# Patient Record
Sex: FEMALE | URBAN - METROPOLITAN AREA
[De-identification: names, ages, dates, MRNs, and addresses within clinical notes are randomized per-mention and may not be internally consistent; named-entity substitution may affect disease eponyms.]

---

## 2021-02-11 ENCOUNTER — OFFICE VISIT (OUTPATIENT)
Dept: FAMILY MEDICINE | Facility: CLINIC | Age: 41
End: 2021-02-11

## 2021-02-11 VITALS
BODY MASS INDEX: 27.05 KG/M2 | SYSTOLIC BLOOD PRESSURE: 125 MMHG | HEART RATE: 61 BPM | WEIGHT: 147 LBS | TEMPERATURE: 97.9 F | RESPIRATION RATE: 20 BRPM | OXYGEN SATURATION: 97 % | HEIGHT: 62 IN | DIASTOLIC BLOOD PRESSURE: 85 MMHG

## 2021-02-11 DIAGNOSIS — B96.89 BACTERIAL VAGINOSIS: ICD-10-CM

## 2021-02-11 DIAGNOSIS — Z11.3 SCREEN FOR STD (SEXUALLY TRANSMITTED DISEASE): Primary | ICD-10-CM

## 2021-02-11 DIAGNOSIS — N76.0 BACTERIAL VAGINOSIS: ICD-10-CM

## 2021-02-11 DIAGNOSIS — R30.0 DYSURIA: ICD-10-CM

## 2021-02-11 DIAGNOSIS — A74.9 CHLAMYDIA INFECTION: ICD-10-CM

## 2021-02-11 LAB
ALBUMIN UR-MCNC: 10 MG/DL
APPEARANCE UR: CLEAR
BACTERIA #/AREA URNS HPF: ABNORMAL /HPF
BILIRUB UR QL STRIP: NEGATIVE
COLOR UR AUTO: ABNORMAL
GLUCOSE UR STRIP-MCNC: NEGATIVE MG/DL
HGB UR QL STRIP: NEGATIVE
KETONES UR STRIP-MCNC: 5 MG/DL
LEUKOCYTE ESTERASE UR QL STRIP: ABNORMAL
MUCOUS THREADS #/AREA URNS LPF: PRESENT /LPF
NITRATE UR QL: NEGATIVE
PH UR STRIP: 6 PH (ref 5–7)
RBC #/AREA URNS AUTO: 2 /HPF (ref 0–2)
SOURCE: ABNORMAL
SP GR UR STRIP: 1.03 (ref 1–1.03)
SPECIMEN SOURCE: ABNORMAL
SQUAMOUS #/AREA URNS AUTO: 2 /HPF (ref 0–1)
TRANS CELLS #/AREA URNS HPF: <1 /HPF (ref 0–1)
UROBILINOGEN UR STRIP-MCNC: NORMAL MG/DL (ref 0–2)
WBC #/AREA URNS AUTO: 13 /HPF (ref 0–5)
WET PREP SPEC: ABNORMAL

## 2021-02-11 RX ORDER — PRENATAL VIT/IRON FUM/FOLIC AC 27MG-0.8MG
1 TABLET ORAL DAILY
COMMUNITY

## 2021-02-11 RX ORDER — AZATHIOPRINE 50 MG/1
50 TABLET ORAL DAILY
COMMUNITY

## 2021-02-11 RX ORDER — FOLIC ACID 1 MG/1
1 TABLET ORAL DAILY
COMMUNITY

## 2021-02-11 RX ORDER — CETIRIZINE HYDROCHLORIDE 10 MG/1
10 TABLET ORAL DAILY
COMMUNITY

## 2021-02-11 ASSESSMENT — MIFFLIN-ST. JEOR: SCORE: 1303.29

## 2021-02-12 LAB
C TRACH DNA SPEC QL NAA+PROBE: POSITIVE
HBV SURFACE AG SERPL QL IA: NONREACTIVE
HIV 1+2 AB+HIV1 P24 AG SERPL QL IA: NONREACTIVE
N GONORRHOEA DNA SPEC QL NAA+PROBE: NEGATIVE
SPECIMEN SOURCE: ABNORMAL
SPECIMEN SOURCE: NORMAL
T PALLIDUM AB SER QL: NONREACTIVE

## 2021-02-12 NOTE — NURSING NOTE
"Patient presented with complaints of suspected chlamydia from a new sexual partner. She and her partner do not use protection currently and she would like to become pregnant. Her partner is not experiencing any abnormal reproductive symptoms.The patients states she has to urinate more frequently and even when she is finished she still feels the urge to urinate. She experiences pain in the genital area that is aggravated upon urination. She describes mild hematuria, itchiness, and a large amount of \"dirty\" looking discharge. The patient states she has had chlamydia before and this seems the same. Discharge and red irritation was observed in the genital area.d A urinalysis will be completed as well as a blood test for Hepatitis B, HIV, and syphlyis. A genital swab and wet prep were completed to test for STIs.   "

## 2021-02-12 NOTE — PROGRESS NOTES
[facility] Pharmacy Progress Note    Chief complaint: DHS presents today for evaluation of a genitourinary infection    Last date of full med:     Subjective:  DHS is a 40 year old female who presents at clinic today for evaluation of a genitourinary infection. She had unprotected sexual intercourse 9 days ago and since then has been experiencing symptoms of burning, itching, and discharge. She states that she had chlamydia in the past and relates these symptoms to being similar to when she had it.       Current Outpatient Medications   Medication Sig Dispense Refill     azaTHIOprine (IMURAN) 50 MG tablet Take 50 mg by mouth daily       cetirizine (ZYRTEC) 10 MG tablet Take 10 mg by mouth daily       folic acid (FOLVITE) 1 MG tablet Take 1 mg by mouth daily       Prenatal Vit-Fe Fumarate-FA (PRENATAL MULTIVITAMIN W/IRON) 27-0.8 MG tablet Take 1 tablet by mouth daily       ibuprofen (ADVIL,MOTRIN) 200 MG tablet Take 200 mg by mouth every 4 hours as needed.       Multiple Vitamins-Minerals (MULTIVITAMIN & MINERAL PO) Take  by mouth.           Objective:  There were no vitals taken for this visit.    BP: 125/85     Assessment:     Genitourinary Infection:  DTP: Needs Additional Therapy: untreated condition   Rationale: Patient is experiencing symptoms burning, itching, and discharge in her vaginal area. A urine culture was done today as well as STD screening. After these labs come in, a treatment regimen can be recommended.     Rhuematoid Arthritis :  DTP: None  Rationale: Patient is currently taking azathioprine, folic acid, and cetirizine for this rhuematoid arthritis. Patient sees a different doctor for this condition and condition is controlled at this time.      Plan:  1. Pending patient's lab results a drug therapy recommendation can be made.       Follow-Up:  N/A    Pharmacy Clinician: Ester Bingham  Pharmacy Preceptor: Kristy Pérez    _____________________________  I am signing this for the preceptor who has been  unable to sign this note in a timely manner.  The content of this note has been reviewed by the preceptor for accuracy.  I did not participate in the care of this patient.  Desmond Ibanez MD - Medical Director, [facility]

## 2021-02-12 NOTE — PATIENT INSTRUCTIONS
En pimentel dionte hoy, hicimos un cultivo de orina y vamos a hacer marcos prueba de varias infecciones. Le vamos a llamar con los resultados en unos espana y Avelina, nuestra estudiante de medicina, le va a hablar sobre los proximos pasos. En la parte abajo, puede jong la informacion sobre enfermedades de transmision sexual mientras espere los resultados.    Patient Education     Enfermedad De Transmisión Sexual, Sospechada [STD, Suspected, Culture Only]  Los síntomas que usted tiene sugieren que usted tiene marcos enfermedad de transmisión sexual (ETS). Las causas mas comunes de ETS son la Chlamydia, Gonorrea y Herpes. Estas son muy contagiosas y se pasan kelvin las relaciones sexuales con marcos persona infectada.  Se chopra realizado pruebas de cultivo de clamidia y gonorrea, que mostrarán si tiene honorio tipo de infección.  Cuidado En [place]:    Si el examen es positivo, usted y pimentel nahum (o parejas) sexual van a necesitar tratamiento antibiótico (con la excepción del herpes). El tratamiento es necesario aunque pimentel nahum (o parejas) no tenga síntomas. En honorio rodney, pimentel nahum (o parejas) avisarle a pimentel propio doctor o ir al [facility] [place] para un examen y tratamiento.    Evite las relaciones sexuales hasta que sabe que el resultado de pimentel examen es negativo.  Seguimiento  con pimentel médico de acuerdo a lo indicado, o si no empieza a mejorar luego de lissa días de síntomas agudos. Si le tomaron marcos muestra para cultivo puede llamar por el resultado, a menos que se le indique lo contrario.  Busque Prontamente Atención Médica  si algo de lo siguiente ocurre:    Fiebre por encima de los 100.4  F (38.0  C) grados después de 2 días de tratamiento    Ronchas (sarpullido) o dolor en las coyunturas    Llagas (úlceras) abiertas en el pene o la vagina    Dolor en la parte baja del abdomen    Hinchazón en las glándulas (linfáticas) cerca al lamine    [address] [employer], [place]. [address] [place] [id number]. Todos los  derechos reservados. Esta información no pretende sustituir la atención médica profesional. Sólo pimentel médico puede diagnosticar y tratar un problema de eula.

## 2021-02-13 LAB
BACTERIA SPEC CULT: ABNORMAL
BACTERIA SPEC CULT: ABNORMAL
Lab: ABNORMAL
SPECIMEN SOURCE: ABNORMAL

## 2021-02-14 NOTE — PROGRESS NOTES
"MEDICINE NOTE    SUBJECTIVE:  Taylor Crystal is a 40-year-old female who presents today with vaginal and urinary symptoms. For the past 9 days, she has been experiencing dysuria and polyuria and has also noticed a small amount of blood in her urine. She feels as if she is not able to finish urinating and describes the pain as \"burning.\" She has also noticed a large amount of vaginal discharge, which is thick, a dirty color, and has no odor, and she's experiencing vaginal itchiness. The patient states that she had chlamydia in the past and experienced very similar symptoms.   Taylor Crystal is currently sexually active with one partner. He is not experiencing any symptoms and has not been diagnosed with any STIs. She initially used protection with him, but they are now trying to get pregnant and are no longer using protection. She is not using any form of contraception at this time. Her last menstrual period began on the 4th or 5th of February. She has had 3 prior pregnancies.     REVIEW OF SYSTEMS:  : dysuria, polyuria, hematuria, vaginal discharge, and itchiness as described above.       No past medical history on file.    No past surgical history on file.    No family history on file.    Social History     Socioeconomic History     Marital status: Single     Spouse name: Not on file     Number of children: Not on file     Years of education: Not on file     Highest education level: Not on file   Occupational History     Not on file   Social Needs     Financial resource strain: Not on file     Food insecurity     Worry: Not on file     Inability: Not on file     Transportation needs     Medical: Not on file     Non-medical: Not on file   Tobacco Use     Smoking status: Not on file   Substance and Sexual Activity     Alcohol use: Not on file     Drug use: Not on file     Sexual activity: Not on file   Lifestyle     Physical activity     Days per week: Not on file     Minutes per session: Not on file     Stress: Not on " "file   Relationships     Social connections     Talks on phone: Not on file     Gets together: Not on file     Attends Voodoo service: Not on file     Active member of club or organization: Not on file     Attends meetings of clubs or organizations: Not on file     Relationship status: Not on file     Intimate partner violence     Fear of current or ex partner: Not on file     Emotionally abused: Not on file     Physically abused: Not on file     Forced sexual activity: Not on file   Other Topics Concern     Not on file   Social History Narrative    Date of Service:2013 Name: Taylor De Leon  (Month, Day, Year of birth): 1980 MRN: [id number] New Patient:No            Preferred Language: SpanishInterpreter Needed: Yes       [place] [facility] Residence: [facility] Status: MarriedHousehold size: 4Number of Dependents:2   Pregnant:Don't know            Average Monthly Income: $ 3000Citizenship Status: Not citizen of [place] Patient was given AA form. She said that she will think about it. - ANA            2020    Patient declined CHW services.       OBJECTIVE:  Physical Exam:  /85 (BP Location: Left arm, Patient Position: Sitting, Cuff Size: Adult Regular)   Pulse 61   Temp 97.9  F (36.6  C)   Resp 20   Ht 1.58 m (5' 2.21\")   Wt 66.7 kg (147 lb)   LMP 2021 (Within Days)   SpO2 97%   BMI 26.71 kg/m    Constitutional: no distress, comfortable, pleasant   : Chlamydia and gonorrhea endocervical swabs were obtained. A lot of thick mucous present. Cervix was erythematous.   Psychological: appropriate mood       ASSESSMENT/PLAN:  Taylor Crystal was seen today for std.    Diagnoses and all orders for this visit:    Screen for STD (sexually transmitted disease)  -     Chlamydia trachomatis PCR  -     Neisseria gonorrhoeae PCR  -     Wet prep  -     Treponema Abs w Reflex to RPR and Titer  -     HIV Antigen Antibody Combo  -     Hepatitis B surface antigen  -     Urine Culture " Aerobic Bacterial    Dysuria  -     UA reflex to Microscopic and Culture      Differential includes chlamydia, gonorrhea, trichomonas, yeast infection, bacterial vaginosis, and UTI. Chlamydia and gonorrhea swabs, a wet prep, and urinalysis were obtained, as well as Hep B, HIV, and Treponema serum tests for screening purposes.   The tests came back positive for Chlamydia and clue cells seen on wet prep, which suggests bacterial vaginosis. UA showed moderate leukocyte esterase, protein, ketones, 13 WBC, few bacteria, and mucous but was negative for nitrites. The urine culture showed Group B strep, which is likely just representative of her normal urogenital fay. All other tests were negative.  We will treat Chlamydia with 1g Azythromycin, as well as providing treatment for her partner. Bacterial vaginosis will be treated with Flagyl 500 mg p.o. bid x7 days. If she is still symptomatic after treatment, she will come back to clinic and we can treat her for GBS bacteriuria Cephelexin 500 mg b.i.d. for 7 days.       Med Clinician: Avelina Dillon  Preceptor: Dr. Cecilia Lizama    I am signing this for the preceptor who has been unable to sign this note in a timely manner.  The content of this note has been reviewed by the preceptor for accuracy.  I did not participate in the care of this patient.  Desmond Ibanez MD - Medical Director, [facility]

## 2021-02-15 ENCOUNTER — OFFICE VISIT (OUTPATIENT)
Dept: FAMILY MEDICINE | Facility: CLINIC | Age: 41
End: 2021-02-15

## 2021-02-15 DIAGNOSIS — B96.89 BACTERIAL VAGINOSIS: ICD-10-CM

## 2021-02-15 DIAGNOSIS — A74.9 CHLAMYDIA INFECTION: Primary | ICD-10-CM

## 2021-02-15 DIAGNOSIS — N76.0 BACTERIAL VAGINOSIS: ICD-10-CM

## 2021-02-15 RX ORDER — AZITHROMYCIN 250 MG/1
1000 TABLET, FILM COATED ORAL ONCE
Qty: 8 TABLET | Refills: 0 | Status: SHIPPED | OUTPATIENT
Start: 2021-02-15 | End: 2021-02-15

## 2021-02-15 RX ORDER — METRONIDAZOLE 500 MG/1
500 TABLET ORAL 2 TIMES DAILY
Qty: 14 TABLET | Refills: 0 | Status: SHIPPED | OUTPATIENT
Start: 2021-02-15 | End: 2021-02-22

## 2021-02-16 NOTE — PROGRESS NOTES
This visit was scheduled as a med refill visit but the patient was seen on 2/11/21 and called 2/15/21 to be informed of test results and plan of care. Today we filled the prescriptions for azithromycin 1000 mg once for patient and azithromycin 1000 mg once for her partner. We also filled the prescription for metronidazole 500 mg twice daily for 7 days. The patient was educated on these prescriptions before leaving clinic.    Itzel Fox, Pharm.D.  Pharmacy Preceptor, [facility]

## 2021-10-04 ENCOUNTER — OFFICE VISIT (OUTPATIENT)
Dept: FAMILY MEDICINE | Facility: CLINIC | Age: 41
End: 2021-10-04

## 2021-10-04 VITALS
WEIGHT: 146.2 LBS | HEART RATE: 55 BPM | DIASTOLIC BLOOD PRESSURE: 77 MMHG | TEMPERATURE: 98.3 F | BODY MASS INDEX: 25.91 KG/M2 | SYSTOLIC BLOOD PRESSURE: 114 MMHG | RESPIRATION RATE: 16 BRPM | OXYGEN SATURATION: 91 % | HEIGHT: 63 IN

## 2021-10-04 DIAGNOSIS — N89.8 VAGINAL DISCHARGE: Primary | ICD-10-CM

## 2021-10-04 LAB
ALBUMIN UR-MCNC: NEGATIVE MG/DL
APPEARANCE UR: CLEAR
BILIRUB UR QL STRIP: NEGATIVE
CLUE CELLS: ABNORMAL
COLOR UR AUTO: NORMAL
GLUCOSE UR STRIP-MCNC: NEGATIVE MG/DL
HCG UR QL: NEGATIVE
HGB UR QL STRIP: NEGATIVE
KETONES UR STRIP-MCNC: NEGATIVE MG/DL
LEUKOCYTE ESTERASE UR QL STRIP: NEGATIVE
NITRATE UR QL: NEGATIVE
PH UR STRIP: 6 [PH] (ref 5–7)
SP GR UR STRIP: 1.02 (ref 1–1.03)
TRICHOMONAS, WET PREP: ABNORMAL
UROBILINOGEN UR STRIP-MCNC: NORMAL MG/DL
WBC'S/HIGH POWER FIELD, WET PREP: ABNORMAL
YEAST, WET PREP: ABNORMAL

## 2021-10-04 RX ORDER — CEFIXIME 400 MG/1
800 CAPSULE ORAL ONCE
Qty: 2 CAPSULE | Refills: 0 | Status: SHIPPED | OUTPATIENT
Start: 2021-10-04 | End: 2021-10-06

## 2021-10-04 RX ORDER — METRONIDAZOLE 500 MG/1
2000 TABLET ORAL ONCE
Qty: 4 TABLET | Refills: 0 | Status: SHIPPED | OUTPATIENT
Start: 2021-10-04 | End: 2021-10-06

## 2021-10-04 RX ORDER — METRONIDAZOLE 500 MG/1
2000 TABLET ORAL ONCE
Qty: 4 TABLET | Refills: 0 | Status: SHIPPED | OUTPATIENT
Start: 2021-10-04 | End: 2021-10-04

## 2021-10-04 RX ORDER — DOXYCYCLINE HYCLATE 100 MG
100 TABLET ORAL 2 TIMES DAILY
Qty: 14 TABLET | Refills: 0 | Status: SHIPPED | OUTPATIENT
Start: 2021-10-04 | End: 2021-10-06

## 2021-10-04 ASSESSMENT — PAIN SCALES - GENERAL: PAINLEVEL: NO PAIN (0)

## 2021-10-04 ASSESSMENT — MIFFLIN-ST. JEOR: SCORE: 1307.16

## 2021-10-04 NOTE — PROGRESS NOTES
"[facility] Nursing Progress Note    Chief complaint: Gynecologic problem    Patient is currently menstruating and found a tampon lodged in her vagina this morning. She is not sure how long it was there, but is concerned it had been  there for a long time. It had a malodorous odor. She is concerned about possible infection. She denies pain or fever.       Objective:  /77 (BP Location: Right arm, Patient Position: Sitting)   Pulse 55   Temp 98.3  F (36.8  C) (Oral)   Resp 16   Ht 1.6 m (5' 2.99\")   Wt 66.3 kg (146 lb 3.2 oz)   SpO2 91%   BMI 25.90 kg/m        PHQ Score:    PHQ2: 0      Nursing Clinician: SN Kirill    \"This patient visit was presented to the preceptors, and the plan of care was agreed upon.\"      "

## 2021-10-05 LAB
C TRACH DNA SPEC QL NAA+PROBE: NEGATIVE
HBV CORE AB SERPL QL IA: NONREACTIVE
HBV SURFACE AB SERPL IA-ACNC: 7.69 M[IU]/ML
HCV AB SERPL QL IA: NONREACTIVE
HIV 1+2 AB+HIV1 P24 AG SERPL QL IA: NONREACTIVE
HOLD SPECIMEN: NORMAL
N GONORRHOEA DNA SPEC QL NAA+PROBE: NEGATIVE
T PALLIDUM AB SER QL: NONREACTIVE

## 2021-10-05 NOTE — PROGRESS NOTES
"MEDICINE NOTE    SUBJECTIVE:  Ms. John De Leon is a 41 year old woman that reports to clinic concerned with vaginal discharge and an odor, the patient describes as smelling \"like death,\" which has lasted for a week and a half. The discharge is described as mustard yellow and sticky. This morning Ms. Lopez found a tampon in her vagina that was yellow in color and was concerned that the tampon had been vaginally retained longer than the tampons she began using in preparation for her menstrual cycle. Since finding a tampon in her vagina this morning, she has thoroughly washed her genitals and reports that the odor has gone away. She notes that her current discharge is different than her normal vaginal discharge. Patient denies vaginal itching or burning with urination. The patient reports abdominal pain, but notes that the pain is the same type she experiences during her regular menstrual cycles. The patient is sexually active and monagomous with her boyfriend of 10 years. She reports her last sexual intercourse with her partner was on 9/28. The patient discloses that she previously was diagnosed and treated for chlamydia last November. She details that she contracted chlamydia from the same partner that she is currently sexually active with and is concerned that she may have received another infection from him.    REVIEW OF SYSTEMS:  Gen: no fevers, chills, night sweats or weight change  Eyes: no vision change  Ears, Noses, Mouth, Throat: no hearing change, no nasal discharge  Cardiac: no chest pain, palpitations  Lungs: no dyspnea, cough, or shortness of breath  GI: no nausea, vomiting, diarrhea or constipation, abdomen pain as reported in subjective  : no change in urine, hematuria,   Skin: no concerning lesions or moles  Neuro: no lweakness  Endo: no polyuria or polydipsia, no temperature intolerance    PMH: Patient reports that she was diagnosed with rheumatoid arthritis 2 years ago and had a Pulmonary " Embolism 8 years ago.    Family History: no known family history.    Social History     Socioeconomic History     Marital status: Single     Spouse name: Not on file     Number of children: Not on file     Years of education: Not on file     Highest education level: Not on file   Occupational History     Not on file   Tobacco Use     Smoking status: Not on file   Substance and Sexual Activity     Alcohol use: Not on file     Drug use: Not on file     Sexual activity: Not on file   Other Topics Concern     Not on file   Social History Narrative    Date of Service:2013 Name: Taylor De Leon  (Month, Day, Year of birth): 1980 MRN: [id number] New Patient:No            Preferred Language: SpanishInterpreter Needed: Yes       [facility] Residence: [facility] Status: MarriedHousehold size: 4Number of Dependents:2   Pregnant:Don't know            Average Monthly Income: $ 3000Citizenship Status: Not citizen of [place] Patient was given AA form. She said that she will think about it. - ANA            2020    Patient declined CHW services.     Social Determinants of Health     Financial Resource Strain:      Difficulty of Paying Living Expenses:    Food Insecurity:      Worried About Running Out of Food in the Last Year:      Ran Out of Food in the Last Year:    Transportation Needs:      Lack of Transportation (Medical):      Lack of Transportation (Non-Medical):    Physical Activity:      Days of Exercise per Week:      Minutes of Exercise per Session:    Stress:      Feeling of Stress :    Social Connections:      Frequency of Communication with Friends and Family:      Frequency of Social Gatherings with Friends and Family:      Attends Jain Services:      Active Member of Clubs or Organizations:      Attends Club or Organization Meetings:      Marital Status:    Intimate Partner Violence:      Fear of Current or Ex-Partner:      Emotionally Abused:      Physically Abused:       "Sexually Abused:        OBJECTIVE:  Physical Exam:  /77 (BP Location: Right arm, Patient Position: Sitting)   Pulse 55   Temp 98.3  F (36.8  C) (Oral)   Resp 16   Ht 1.6 m (5' 2.99\")   Wt 66.3 kg (146 lb 3.2 oz)   SpO2 91%   BMI 25.90 kg/m      Gynecological: Pelvic Exam was performed and detected somewhat friable cervix, but non-tender opaque green thick vaginal discharge.    ASSESSMENT/PLAN:  Taylor Crystal was seen today for vaginal problem.    Diagnoses and all orders for this visit:    Vaginal discharge  -     Wet prep  -     NEISSERIA GONORRHOEA PCR  -     CHLAMYDIA TRACHOMATIS PCR  -     UA reflex to Microscopic and Culture; Future  -     HCG Qual, Urine ([id number]); Future  -     metroNIDAZOLE (FLAGYL) 500 MG tablet; Take 4 tablets (2,000 mg) by mouth once for 1 dose    Ms. John De Leon was consoled on proper tampon use and suggested using alternative menstrual hygiene products.    Med Clinician: Dominick Bernard  Preceptor: LEW Purvis.    In supervising the medical student, I repeated the exam documented above.  I have reviewed and verified the student s documentation.  Supervising Provider: Dr. Rosa Crarillo      "

## 2021-10-05 NOTE — PROGRESS NOTES
"[facility] Pharmacy Progress Note    Chief complaint: Vaginal Discharge    Last date of full med:     Subjective:  The patient has had yellow vaginal discharge starting a week and a half ago. She began putting in tampons for this and then also began her period. She states she put a tampon in last night and woke up this morning to pull it out and had to reach up inside herself to pull out a flattened and old-looking tampon. She was worried this tampon was what caused her yellow discharge. She is sexually active and last had intercourse with her boyfriend last Tuesday. She is monogamous with her boyfriend but her boyfriend has multiple sexual partners. He has given her chlamydia in the past which she had treated. During a pelvic examination today her cervix seemed red and inflamed.      Current Outpatient Medications   Medication Sig Dispense Refill     azaTHIOprine (IMURAN) 50 MG tablet Take 50 mg by mouth daily       cetirizine (ZYRTEC) 10 MG tablet Take 10 mg by mouth daily       folic acid (FOLVITE) 1 MG tablet Take 1 mg by mouth daily       ibuprofen (ADVIL,MOTRIN) 200 MG tablet Take 200 mg by mouth every 4 hours as needed.       Multiple Vitamins-Minerals (MULTIVITAMIN & MINERAL PO) Take  by mouth.       Prenatal Vit-Fe Fumarate-FA (PRENATAL MULTIVITAMIN W/IRON) 27-0.8 MG tablet Take 1 tablet by mouth daily           Objective:  /77 (BP Location: Right arm, Patient Position: Sitting)   Pulse 55   Temp 98.3  F (36.8  C) (Oral)   Resp 16   Ht 1.6 m (5' 2.99\")   Wt 66.3 kg (146 lb 3.2 oz)   SpO2 91%   BMI 25.90 kg/m          Assessment:     Untreated Infection:  DTP: Needs Additional Therapy: untreated condition   Rationale: This patient needs additional therapy to treat her infection once we determine what her infection is.      Plan:  1. Once you get a call from the medical student, he will determine what medication is needed for the infection.  2. NO medications were dispensed or administered today - " removed orders from med list.    Follow-Up:  The patient will come back to the Clinic on Monday to determine the next steps of action once we have medications in the pharmacy to treat her infections. We will determine which medication to use once the labs come back for her. On Monday they will also assess her for any changes she might be experiencing in discharge, pain, or other symptoms.    Pharmacy Clinician: Teresa Hanna  Pharmacy Preceptor: Terrie Celaya PharmD    _____________________________  Preceptor Use Only:  In supervising the student, I have reviewed and verified the student's documentation and found it to be correct and complete.     Initial plan per med preceptor was for pt to receive ceftriaxone injection in clinic, metronidazole Rx for pickup, and doxycycline Rx for pickup. However, [facility] did not have anything to reconstitute ceftriaxone with. Metronidazole was also  and unable to be dispensed. After further discussion with pt and preceptor, pt wanted to wait to be treated until labs resulted. NO medications were dispensed or administered today - removed orders from med list.    Preceptor Signature: Terrie Celaya PharmD

## 2021-10-05 NOTE — PATIENT INSTRUCTIONS
Patient Visit Summary    Patient Name: Taylor De Leon  MRN: [id number]    Fecha de Visita: 10/4/2021    Diagnosis Principal: Secrecion vaginal     Physician's Recommendations/Instructions/ Instrucciones: Los estudiantes de medicina la llamaracon los resultados de jennifer examenes de orina, de lindsey y de el examen de pelvis. Por favor regrese el Lunes (10/11) para que le podamos radhames los medicamientos apropriados para pimentel secrecion vaginal.     Lab Tests Performed/Examenes: Examen gynecologico para clamidia y gonorrea.  Examen gynecologico para infecciones de la vagina.   Examen de embarazo - negativo  Examen del orin  Exam de lindsey para Sifilis (RPR), HIV, Hepatitis C y Hepatitis B.       Follow Up/Results/Resultados: La llamaremos con los resultados de jennifer examenes en los proximos espana.     Physician: Dr. Carrillo